# Patient Record
Sex: FEMALE | Race: BLACK OR AFRICAN AMERICAN | Employment: UNEMPLOYED | URBAN - METROPOLITAN AREA
[De-identification: names, ages, dates, MRNs, and addresses within clinical notes are randomized per-mention and may not be internally consistent; named-entity substitution may affect disease eponyms.]

---

## 2023-12-27 ENCOUNTER — HOSPITAL ENCOUNTER (OUTPATIENT)
Age: 28
Discharge: HOME | End: 2023-12-27
Attending: OBSTETRICS & GYNECOLOGY | Admitting: OBSTETRICS & GYNECOLOGY

## 2023-12-27 ENCOUNTER — APPOINTMENT (OUTPATIENT)
Dept: ULTRASOUND IMAGING | Age: 28
End: 2023-12-27

## 2023-12-27 VITALS
SYSTOLIC BLOOD PRESSURE: 104 MMHG | OXYGEN SATURATION: 99 % | TEMPERATURE: 98.4 F | DIASTOLIC BLOOD PRESSURE: 67 MMHG | RESPIRATION RATE: 18 BRPM | HEART RATE: 80 BPM

## 2023-12-27 LAB
ABO/RH: NORMAL
AMPHETAMINES: NEGATIVE
ANTIBODY SCREEN: NEGATIVE
B PARAP IS1001 DNA NPH QL NAA+NON-PROBE: NOT DETECTED
B PERT.PT PRMT NPH QL NAA+NON-PROBE: NOT DETECTED
BACTERIA: ABNORMAL /HPF
BARBITURATE SCREEN URINE: NEGATIVE
BASOPHILS ABSOLUTE: 0 K/CU MM
BASOPHILS RELATIVE PERCENT: 0.5 % (ref 0–1)
BENZODIAZEPINE SCREEN, URINE: NEGATIVE
BILIRUBIN URINE: NEGATIVE MG/DL
BLOOD, URINE: NEGATIVE
C PNEUM DNA NPH QL NAA+NON-PROBE: NOT DETECTED
CANNABINOID SCREEN URINE: NEGATIVE
CLARITY: CLEAR
COCAINE METABOLITE: NEGATIVE
COLOR: YELLOW
DIFFERENTIAL TYPE: ABNORMAL
EOSINOPHILS ABSOLUTE: 0.1 K/CU MM
EOSINOPHILS RELATIVE PERCENT: 0.9 % (ref 0–3)
FENTANYL URINE: NEGATIVE
FLUAV H1 2009 PAN RNA NPH NAA+NON-PROBE: NOT DETECTED
FLUAV H1 RNA NPH QL NAA+NON-PROBE: NOT DETECTED
FLUAV H3 RNA NPH QL NAA+NON-PROBE: NOT DETECTED
FLUAV RNA NPH QL NAA+NON-PROBE: NOT DETECTED
FLUBV RNA NPH QL NAA+NON-PROBE: NOT DETECTED
GLUCOSE, URINE: NEGATIVE MG/DL
HADV DNA NPH QL NAA+NON-PROBE: NOT DETECTED
HBV SURFACE AG SERPL QL IA: NON REACTIVE
HCOV 229E RNA NPH QL NAA+NON-PROBE: NOT DETECTED
HCOV HKU1 RNA NPH QL NAA+NON-PROBE: NOT DETECTED
HCOV NL63 RNA NPH QL NAA+NON-PROBE: NOT DETECTED
HCOV OC43 RNA NPH QL NAA+NON-PROBE: NOT DETECTED
HCT VFR BLD CALC: 33.2 % (ref 37–47)
HCV AB SERPL QL IA: NON REACTIVE
HEMOGLOBIN: 10.5 GM/DL (ref 12.5–16)
HIV 1+2 AB+HIV1P24 AG SERPLBLD IA.RAPID: NON REACTIVE
HMPV RNA NPH QL NAA+NON-PROBE: NOT DETECTED
HPIV1 RNA NPH QL NAA+NON-PROBE: NOT DETECTED
HPIV2 RNA NPH QL NAA+NON-PROBE: NOT DETECTED
HPIV3 RNA NPH QL NAA+NON-PROBE: NOT DETECTED
HPIV4 RNA NPH QL NAA+NON-PROBE: NOT DETECTED
IMMATURE NEUTROPHIL %: 0.3 % (ref 0–0.43)
KETONES, URINE: NEGATIVE MG/DL
LEUKOCYTE ESTERASE, URINE: ABNORMAL
LYMPHOCYTES ABSOLUTE: 2 K/CU MM
LYMPHOCYTES RELATIVE PERCENT: 29.6 % (ref 24–44)
M PNEUMO DNA NPH QL NAA+NON-PROBE: NOT DETECTED
MCH RBC QN AUTO: 28 PG (ref 27–31)
MCHC RBC AUTO-ENTMCNC: 31.6 % (ref 32–36)
MCV RBC AUTO: 88.5 FL (ref 78–100)
MONOCYTES ABSOLUTE: 0.5 K/CU MM
MONOCYTES RELATIVE PERCENT: 7.2 % (ref 0–4)
MUCUS: ABNORMAL HPF
NITRITE URINE, QUANTITATIVE: NEGATIVE
NUCLEATED RBC %: 0 %
OPIATES, URINE: NEGATIVE
OXYCODONE: NEGATIVE
PDW BLD-RTO: 13.4 % (ref 11.7–14.9)
PH, URINE: 7 (ref 5–8)
PLATELET # BLD: 174 K/CU MM (ref 140–440)
PMV BLD AUTO: 13 FL (ref 7.5–11.1)
PROTEIN UA: NEGATIVE MG/DL
RBC # BLD: 3.75 M/CU MM (ref 4.2–5.4)
RBC URINE: <1 /HPF (ref 0–6)
RPR SER QL: NON REACTIVE
RSV RNA NPH QL NAA+NON-PROBE: NOT DETECTED
RV+EV RNA NPH QL NAA+NON-PROBE: NOT DETECTED
SARS-COV-2 RNA NPH QL NAA+NON-PROBE: NOT DETECTED
SEGMENTED NEUTROPHILS ABSOLUTE COUNT: 4.1 K/CU MM
SEGMENTED NEUTROPHILS RELATIVE PERCENT: 61.5 % (ref 36–66)
SPECIFIC GRAVITY UA: 1.01 (ref 1–1.03)
SQUAMOUS EPITHELIAL: 8 /HPF
T. PALLIDUM SCREEN: NEGATIVE
TOTAL IMMATURE NEUTOROPHIL: 0.02 K/CU MM
TOTAL NUCLEATED RBC: 0 K/CU MM
TRICHOMONAS: ABNORMAL /HPF
UROBILINOGEN, URINE: 0.2 MG/DL (ref 0.2–1)
WBC # BLD: 6.7 K/CU MM (ref 4–10.5)
WBC UA: 1 /HPF (ref 0–5)

## 2023-12-27 PROCEDURE — 86780 TREPONEMA PALLIDUM: CPT

## 2023-12-27 PROCEDURE — 85025 COMPLETE CBC W/AUTO DIFF WBC: CPT

## 2023-12-27 PROCEDURE — 86850 RBC ANTIBODY SCREEN: CPT

## 2023-12-27 PROCEDURE — 86900 BLOOD TYPING SEROLOGIC ABO: CPT

## 2023-12-27 PROCEDURE — 87491 CHLMYD TRACH DNA AMP PROBE: CPT

## 2023-12-27 PROCEDURE — 76805 OB US >/= 14 WKS SNGL FETUS: CPT

## 2023-12-27 PROCEDURE — 81001 URINALYSIS AUTO W/SCOPE: CPT

## 2023-12-27 PROCEDURE — 80307 DRUG TEST PRSMV CHEM ANLYZR: CPT

## 2023-12-27 PROCEDURE — 86762 RUBELLA ANTIBODY: CPT

## 2023-12-27 PROCEDURE — 0202U NFCT DS 22 TRGT SARS-COV-2: CPT

## 2023-12-27 PROCEDURE — 87389 HIV-1 AG W/HIV-1&-2 AB AG IA: CPT

## 2023-12-27 PROCEDURE — 87081 CULTURE SCREEN ONLY: CPT

## 2023-12-27 PROCEDURE — 87591 N.GONORRHOEAE DNA AMP PROB: CPT

## 2023-12-27 PROCEDURE — 87340 HEPATITIS B SURFACE AG IA: CPT

## 2023-12-27 PROCEDURE — 86803 HEPATITIS C AB TEST: CPT

## 2023-12-27 PROCEDURE — 76811 OB US DETAILED SNGL FETUS: CPT

## 2023-12-27 PROCEDURE — 86901 BLOOD TYPING SEROLOGIC RH(D): CPT

## 2023-12-27 PROCEDURE — 86592 SYPHILIS TEST NON-TREP QUAL: CPT

## 2023-12-27 PROCEDURE — 99213 OFFICE O/P EST LOW 20 MIN: CPT

## 2023-12-27 RX ORDER — ACETAMINOPHEN 500 MG
1000 TABLET ORAL EVERY 8 HOURS SCHEDULED
Status: DISCONTINUED | OUTPATIENT
Start: 2023-12-27 | End: 2023-12-27 | Stop reason: HOSPADM

## 2023-12-27 NOTE — FLOWSHEET NOTE
Pt presents amb to unit for c/o not feeling well and neck soreness. States has been ongoing for past month. Last Dr visit was about a month ago while in [facility]. Denies vaginal bleeding or leaking fluid. Abd soft and non tender. Denies vomiting or diarrhea. POC discussed.  [place] notified of pt arrival. Yes

## 2023-12-27 NOTE — FLOWSHEET NOTE
EFM of discharge instructions given and explained. Instructed to follow up on unit for decreased FM, vaginal bleeding, leaking fluid, or contractions. Pt verbalized understanding and left amb from unit for home without distress.

## 2023-12-27 NOTE — FLOWSHEET NOTE
Use of [place]  ID [id number] to explain the need to draw labs, and obtain COVID, flu and GBS swabs. Pt voiced understanding and consents. Denies questions or concerns.

## 2023-12-27 NOTE — FLOWSHEET NOTE
Pt tolerated getting labs and swabs obtained. Denies any questions or concerns. Call light within reach and bed in lowest position.

## 2023-12-28 LAB — T PALLIDUM AB SER QL AGGL: NON REACTIVE

## 2023-12-29 LAB
C TRACH RRNA SPEC QL NAA+PROBE: NEGATIVE
N GONORRHOEA RRNA SPEC QL NAA+PROBE: NEGATIVE
RUBV IGG SER-ACNC: 40.6 IU/ML

## 2023-12-31 LAB
CULTURE: NORMAL
Lab: NORMAL
SPECIMEN: NORMAL

## 2024-01-20 ENCOUNTER — HOSPITAL ENCOUNTER (INPATIENT)
Age: 29
LOS: 2 days | Discharge: HOME | DRG: 560 | End: 2024-01-22
Attending: OBSTETRICS & GYNECOLOGY | Admitting: OBSTETRICS & GYNECOLOGY
Payer: COMMERCIAL

## 2024-01-20 ENCOUNTER — ANESTHESIA EVENT (OUTPATIENT)
Dept: LABOR AND DELIVERY | Age: 29
End: 2024-01-20
Payer: COMMERCIAL

## 2024-01-20 ENCOUNTER — ANESTHESIA (OUTPATIENT)
Dept: LABOR AND DELIVERY | Age: 29
End: 2024-01-20
Payer: COMMERCIAL

## 2024-01-20 LAB
ABO/RH: NORMAL
AMPHETAMINES: NEGATIVE
ANTIBODY SCREEN: NEGATIVE
BARBITURATE SCREEN URINE: NEGATIVE
BASOPHILS ABSOLUTE: 0 K/CU MM
BASOPHILS RELATIVE PERCENT: 0.5 % (ref 0–1)
BENZODIAZEPINE SCREEN, URINE: NEGATIVE
CANNABINOID SCREEN URINE: NEGATIVE
COCAINE METABOLITE: NEGATIVE
DIFFERENTIAL TYPE: ABNORMAL
EOSINOPHILS ABSOLUTE: 0.1 K/CU MM
EOSINOPHILS RELATIVE PERCENT: 2.1 % (ref 0–3)
FENTANYL URINE: NEGATIVE
GLUCOSE BLD-MCNC: 82 MG/DL (ref 70–99)
HCT VFR BLD CALC: 35.2 % (ref 37–47)
HEMOGLOBIN: 11.3 GM/DL (ref 12.5–16)
IMMATURE NEUTROPHIL %: 0.4 % (ref 0–0.43)
LYMPHOCYTES ABSOLUTE: 2.3 K/CU MM
LYMPHOCYTES RELATIVE PERCENT: 41.6 % (ref 24–44)
MCH RBC QN AUTO: 27.2 PG (ref 27–31)
MCHC RBC AUTO-ENTMCNC: 32.1 % (ref 32–36)
MCV RBC AUTO: 84.6 FL (ref 78–100)
MONOCYTES ABSOLUTE: 0.4 K/CU MM
MONOCYTES RELATIVE PERCENT: 7.3 % (ref 0–4)
NUCLEATED RBC %: 0 %
OPIATES, URINE: NEGATIVE
OXYCODONE: NEGATIVE
PDW BLD-RTO: 15.6 % (ref 11.7–14.9)
PLATELET # BLD: 198 K/CU MM (ref 140–440)
PMV BLD AUTO: 12.4 FL (ref 7.5–11.1)
RBC # BLD: 4.16 M/CU MM (ref 4.2–5.4)
SEGMENTED NEUTROPHILS ABSOLUTE COUNT: 2.7 K/CU MM
SEGMENTED NEUTROPHILS RELATIVE PERCENT: 48.1 % (ref 36–66)
T. PALLIDUM SCREEN: NEGATIVE
TOTAL IMMATURE NEUTOROPHIL: 0.02 K/CU MM
TOTAL NUCLEATED RBC: 0 K/CU MM
WBC # BLD: 5.6 K/CU MM (ref 4–10.5)

## 2024-01-20 PROCEDURE — 86900 BLOOD TYPING SEROLOGIC ABO: CPT

## 2024-01-20 PROCEDURE — 86850 RBC ANTIBODY SCREEN: CPT

## 2024-01-20 PROCEDURE — 6360000002 HC RX W HCPCS: Performed by: OBSTETRICS & GYNECOLOGY

## 2024-01-20 PROCEDURE — 2580000003 HC RX 258: Performed by: OBSTETRICS & GYNECOLOGY

## 2024-01-20 PROCEDURE — 6370000000 HC RX 637 (ALT 250 FOR IP): Performed by: OBSTETRICS & GYNECOLOGY

## 2024-01-20 PROCEDURE — 10907ZC DRAINAGE OF AMNIOTIC FLUID, THERAPEUTIC FROM PRODUCTS OF CONCEPTION, VIA NATURAL OR ARTIFICIAL OPENING: ICD-10-PCS | Performed by: OBSTETRICS & GYNECOLOGY

## 2024-01-20 PROCEDURE — 2500000003 HC RX 250 WO HCPCS: Performed by: NURSE ANESTHETIST, CERTIFIED REGISTERED

## 2024-01-20 PROCEDURE — 6360000002 HC RX W HCPCS: Performed by: NURSE ANESTHETIST, CERTIFIED REGISTERED

## 2024-01-20 PROCEDURE — 3700000025 EPIDURAL BLOCK: Performed by: ANESTHESIOLOGY

## 2024-01-20 PROCEDURE — 1220000000 HC SEMI PRIVATE OB R&B

## 2024-01-20 PROCEDURE — 80307 DRUG TEST PRSMV CHEM ANLYZR: CPT

## 2024-01-20 PROCEDURE — 82962 GLUCOSE BLOOD TEST: CPT

## 2024-01-20 PROCEDURE — 86780 TREPONEMA PALLIDUM: CPT

## 2024-01-20 PROCEDURE — 7200000001 HC VAGINAL DELIVERY

## 2024-01-20 PROCEDURE — 51702 INSERT TEMP BLADDER CATH: CPT

## 2024-01-20 PROCEDURE — 86901 BLOOD TYPING SEROLOGIC RH(D): CPT

## 2024-01-20 PROCEDURE — 85025 COMPLETE CBC W/AUTO DIFF WBC: CPT

## 2024-01-20 PROCEDURE — 2500000003 HC RX 250 WO HCPCS: Performed by: ADVANCED PRACTICE MIDWIFE

## 2024-01-20 PROCEDURE — 0KQM0ZZ REPAIR PERINEUM MUSCLE, OPEN APPROACH: ICD-10-PCS | Performed by: OBSTETRICS & GYNECOLOGY

## 2024-01-20 RX ORDER — METHYLERGONOVINE MALEATE 0.2 MG/ML
200 INJECTION INTRAVENOUS PRN
Status: DISCONTINUED | OUTPATIENT
Start: 2024-01-20 | End: 2024-01-20 | Stop reason: SDUPTHER

## 2024-01-20 RX ORDER — SODIUM CHLORIDE 9 MG/ML
INJECTION, SOLUTION INTRAVENOUS PRN
Status: DISCONTINUED | OUTPATIENT
Start: 2024-01-20 | End: 2024-01-22 | Stop reason: HOSPADM

## 2024-01-20 RX ORDER — FAMOTIDINE 10 MG/ML
20 INJECTION, SOLUTION INTRAVENOUS 2 TIMES DAILY
Status: DISCONTINUED | OUTPATIENT
Start: 2024-01-20 | End: 2024-01-20

## 2024-01-20 RX ORDER — ROPIVACAINE HYDROCHLORIDE 2 MG/ML
INJECTION, SOLUTION EPIDURAL; INFILTRATION; PERINEURAL PRN
Status: DISCONTINUED | OUTPATIENT
Start: 2024-01-20 | End: 2024-01-20 | Stop reason: SDUPTHER

## 2024-01-20 RX ORDER — ROPIVACAINE HYDROCHLORIDE 2 MG/ML
10 INJECTION, SOLUTION EPIDURAL; INFILTRATION; PERINEURAL CONTINUOUS
Status: DISCONTINUED | OUTPATIENT
Start: 2024-01-20 | End: 2024-01-20

## 2024-01-20 RX ORDER — LIDOCAINE HCL/EPINEPHRINE/PF 2%-1:200K
VIAL (ML) INJECTION PRN
Status: DISCONTINUED | OUTPATIENT
Start: 2024-01-20 | End: 2024-01-20 | Stop reason: SDUPTHER

## 2024-01-20 RX ORDER — ACETAMINOPHEN 325 MG/1
650 TABLET ORAL EVERY 4 HOURS PRN
Status: DISCONTINUED | OUTPATIENT
Start: 2024-01-20 | End: 2024-01-20

## 2024-01-20 RX ORDER — NALOXONE HYDROCHLORIDE 0.4 MG/ML
INJECTION, SOLUTION INTRAMUSCULAR; INTRAVENOUS; SUBCUTANEOUS PRN
Status: DISCONTINUED | OUTPATIENT
Start: 2024-01-20 | End: 2024-01-20

## 2024-01-20 RX ORDER — SODIUM CHLORIDE 0.9 % (FLUSH) 0.9 %
5-40 SYRINGE (ML) INJECTION EVERY 12 HOURS SCHEDULED
Status: DISCONTINUED | OUTPATIENT
Start: 2024-01-20 | End: 2024-01-22 | Stop reason: HOSPADM

## 2024-01-20 RX ORDER — TRANEXAMIC ACID 10 MG/ML
1000 INJECTION, SOLUTION INTRAVENOUS
Status: DISCONTINUED | OUTPATIENT
Start: 2024-01-20 | End: 2024-01-20 | Stop reason: SDUPTHER

## 2024-01-20 RX ORDER — METHYLERGONOVINE MALEATE 0.2 MG/ML
200 INJECTION INTRAVENOUS PRN
Status: DISCONTINUED | OUTPATIENT
Start: 2024-01-20 | End: 2024-01-22 | Stop reason: HOSPADM

## 2024-01-20 RX ORDER — ACETAMINOPHEN 500 MG
1000 TABLET ORAL EVERY 8 HOURS SCHEDULED
Status: DISCONTINUED | OUTPATIENT
Start: 2024-01-20 | End: 2024-01-22 | Stop reason: HOSPADM

## 2024-01-20 RX ORDER — ONDANSETRON 2 MG/ML
4 INJECTION INTRAMUSCULAR; INTRAVENOUS EVERY 6 HOURS PRN
Status: DISCONTINUED | OUTPATIENT
Start: 2024-01-20 | End: 2024-01-22 | Stop reason: HOSPADM

## 2024-01-20 RX ORDER — MISOPROSTOL 200 UG/1
200 TABLET ORAL PRN
Status: DISCONTINUED | OUTPATIENT
Start: 2024-01-20 | End: 2024-01-22 | Stop reason: HOSPADM

## 2024-01-20 RX ORDER — LIDOCAINE HYDROCHLORIDE 10 MG/ML
INJECTION, SOLUTION EPIDURAL; INFILTRATION; INTRACAUDAL; PERINEURAL PRN
Status: DISCONTINUED | OUTPATIENT
Start: 2024-01-20 | End: 2024-01-20 | Stop reason: SDUPTHER

## 2024-01-20 RX ORDER — SODIUM CHLORIDE 0.9 % (FLUSH) 0.9 %
5-40 SYRINGE (ML) INJECTION PRN
Status: DISCONTINUED | OUTPATIENT
Start: 2024-01-20 | End: 2024-01-22 | Stop reason: HOSPADM

## 2024-01-20 RX ORDER — MISOPROSTOL 200 UG/1
800 TABLET ORAL PRN
Status: DISCONTINUED | OUTPATIENT
Start: 2024-01-20 | End: 2024-01-20 | Stop reason: SDUPTHER

## 2024-01-20 RX ORDER — DOCUSATE SODIUM 100 MG/1
100 CAPSULE, LIQUID FILLED ORAL 2 TIMES DAILY
Status: DISCONTINUED | OUTPATIENT
Start: 2024-01-20 | End: 2024-01-22 | Stop reason: HOSPADM

## 2024-01-20 RX ORDER — MISOPROSTOL 200 UG/1
800 TABLET ORAL PRN
Status: DISCONTINUED | OUTPATIENT
Start: 2024-01-20 | End: 2024-01-22 | Stop reason: HOSPADM

## 2024-01-20 RX ORDER — ONDANSETRON 2 MG/ML
4 INJECTION INTRAMUSCULAR; INTRAVENOUS EVERY 6 HOURS PRN
Status: DISCONTINUED | OUTPATIENT
Start: 2024-01-20 | End: 2024-01-20 | Stop reason: SDUPTHER

## 2024-01-20 RX ORDER — CARBOPROST TROMETHAMINE 250 UG/ML
250 INJECTION, SOLUTION INTRAMUSCULAR PRN
Status: DISCONTINUED | OUTPATIENT
Start: 2024-01-20 | End: 2024-01-20

## 2024-01-20 RX ORDER — IBUPROFEN 800 MG/1
800 TABLET ORAL EVERY 8 HOURS SCHEDULED
Status: DISCONTINUED | OUTPATIENT
Start: 2024-01-20 | End: 2024-01-22 | Stop reason: HOSPADM

## 2024-01-20 RX ORDER — TRANEXAMIC ACID 10 MG/ML
1000 INJECTION, SOLUTION INTRAVENOUS
Status: ACTIVE | OUTPATIENT
Start: 2024-01-20 | End: 2024-01-21

## 2024-01-20 RX ORDER — FENTANYL CITRATE 50 UG/ML
100 INJECTION, SOLUTION INTRAMUSCULAR; INTRAVENOUS
Status: DISCONTINUED | OUTPATIENT
Start: 2024-01-20 | End: 2024-01-20

## 2024-01-20 RX ORDER — SODIUM CHLORIDE, SODIUM LACTATE, POTASSIUM CHLORIDE, CALCIUM CHLORIDE 600; 310; 30; 20 MG/100ML; MG/100ML; MG/100ML; MG/100ML
INJECTION, SOLUTION INTRAVENOUS CONTINUOUS
Status: DISCONTINUED | OUTPATIENT
Start: 2024-01-20 | End: 2024-01-20

## 2024-01-20 RX ORDER — LANOLIN 72 %
OINTMENT (GRAM) TOPICAL PRN
Status: DISCONTINUED | OUTPATIENT
Start: 2024-01-20 | End: 2024-01-22 | Stop reason: HOSPADM

## 2024-01-20 RX ORDER — LIDOCAINE HYDROCHLORIDE 10 MG/ML
30 INJECTION, SOLUTION EPIDURAL; INFILTRATION; INTRACAUDAL; PERINEURAL PRN
Status: DISCONTINUED | OUTPATIENT
Start: 2024-01-20 | End: 2024-01-20

## 2024-01-20 RX ADMIN — DOCUSATE SODIUM 100 MG: 100 CAPSULE, LIQUID FILLED ORAL at 20:43

## 2024-01-20 RX ADMIN — SODIUM CHLORIDE, POTASSIUM CHLORIDE, SODIUM LACTATE AND CALCIUM CHLORIDE: 600; 310; 30; 20 INJECTION, SOLUTION INTRAVENOUS at 04:07

## 2024-01-20 RX ADMIN — IBUPROFEN 800 MG: 800 TABLET, FILM COATED ORAL at 17:45

## 2024-01-20 RX ADMIN — FENTANYL CITRATE 100 MCG: 50 INJECTION, SOLUTION INTRAMUSCULAR; INTRAVENOUS at 05:33

## 2024-01-20 RX ADMIN — Medication 166.7 ML: at 15:47

## 2024-01-20 RX ADMIN — LIDOCAINE HYDROCHLORIDE 3 ML: 10 INJECTION, SOLUTION EPIDURAL; INFILTRATION; INTRACAUDAL; PERINEURAL at 08:00

## 2024-01-20 RX ADMIN — ROPIVACAINE HYDROCHLORIDE 10 ML: 2 INJECTION, SOLUTION EPIDURAL; INFILTRATION at 08:07

## 2024-01-20 RX ADMIN — LIDOCAINE HYDROCHLORIDE AND EPINEPHRINE 3 ML: 20; 5 INJECTION, SOLUTION EPIDURAL; INFILTRATION; INTRACAUDAL; PERINEURAL at 08:06

## 2024-01-20 RX ADMIN — FAMOTIDINE 20 MG: 10 INJECTION, SOLUTION INTRAVENOUS at 13:39

## 2024-01-20 RX ADMIN — Medication 1 MILLI-UNITS/MIN: at 08:54

## 2024-01-20 RX ADMIN — IBUPROFEN 800 MG: 800 TABLET, FILM COATED ORAL at 20:43

## 2024-01-20 RX ADMIN — ACETAMINOPHEN 1000 MG: 500 TABLET ORAL at 20:43

## 2024-01-20 RX ADMIN — Medication 10 ML: at 21:00

## 2024-01-20 RX ADMIN — ROPIVACAINE HYDROCHLORIDE 10 ML/HR: 2 INJECTION, SOLUTION EPIDURAL; INFILTRATION at 08:08

## 2024-01-20 RX ADMIN — SODIUM CHLORIDE, POTASSIUM CHLORIDE, SODIUM LACTATE AND CALCIUM CHLORIDE: 600; 310; 30; 20 INJECTION, SOLUTION INTRAVENOUS at 07:55

## 2024-01-20 RX ADMIN — SODIUM CHLORIDE, POTASSIUM CHLORIDE, SODIUM LACTATE AND CALCIUM CHLORIDE: 600; 310; 30; 20 INJECTION, SOLUTION INTRAVENOUS at 09:38

## 2024-01-20 ASSESSMENT — PAIN DESCRIPTION - LOCATION: LOCATION: ABDOMEN

## 2024-01-20 ASSESSMENT — PAIN - FUNCTIONAL ASSESSMENT
PAIN_FUNCTIONAL_ASSESSMENT: ACTIVITIES ARE NOT PREVENTED
PAIN_FUNCTIONAL_ASSESSMENT: ACTIVITIES ARE NOT PREVENTED

## 2024-01-20 ASSESSMENT — PAIN SCALES - GENERAL
PAINLEVEL_OUTOF10: 0
PAINLEVEL_OUTOF10: 9
PAINLEVEL_OUTOF10: 0

## 2024-01-20 ASSESSMENT — PAIN DESCRIPTION - DESCRIPTORS: DESCRIPTORS: CRAMPING

## 2024-01-20 ASSESSMENT — PAIN DESCRIPTION - ORIENTATION: ORIENTATION: LOWER

## 2024-01-20 NOTE — ANESTHESIA PRE PROCEDURE
Department of Anesthesiology  Preprocedure Note       Name:  Lorrie Tierney   Age:  28 y.o.  :  1995                                          MRN:  [id number]         Date:  2024      Surgeon: * No surgeons listed *    Procedure: * No procedures listed *    Medications prior to admission:   Prior to Admission medications    Medication Sig Start Date End Date Taking? Authorizing Provider   Prenatal MV-Min-Fe Fum-FA-DHA (PRENATAL 1 PO) Take by mouth    Provider, MD Chika       Current medications:    Current Facility-Administered Medications   Medication Dose Route Frequency Provider Last Rate Last Admin   • lactated ringers IV soln infusion   IntraVENous Continuous Giles Santos  mL/hr at 24 0930 Rate Verify at 24 0930   • ondansetron (ZOFRAN) injection 4 mg  4 mg IntraVENous Q6H PRN Giles Santos MD       • oxytocin (PITOCIN) 30 units in 500 mL infusion  87.3 inez-units/min IntraVENous Continuous PRN Giles Santos MD        And   • oxytocin (PITOCIN) 10 unit bolus from the bag  10 Units IntraVENous PRN Giles Santos MD       • methylergonovine (METHERGINE) injection 200 mcg  200 mcg IntraMUSCular PRN Giles Santos MD       • carboprost (HEMABATE) injection 250 mcg  250 mcg IntraMUSCular PRN Giles Santos MD       • miSOPROStol (CYTOTEC) tablet 800 mcg  800 mcg Rectal PRN Giles Santos MD       • tranexamic acid-NaCl IVPB premix 1,000 mg  1,000 mg IntraVENous Once PRN Giles Santos MD       • acetaminophen (TYLENOL) tablet 650 mg  650 mg Oral Q4H PRN Giles Santos MD       • benzocaine-menthol (DERMOPLAST) 20-0.5 % spray   Topical PRN Giles Santos MD       • fentaNYL (SUBLIMAZE) injection 100 mcg  100 mcg IntraVENous Q1H PRN Giles Santos MD   100 mcg at 24 0533   • lidocaine PF 1 % injection 30 mL  30 mL Other PRN Giles Santos MD       • oxytocin (PITOCIN)

## 2024-01-20 NOTE — L&D DELIVERY SUMMARY NOTE
Department of Obstetrics and Gynecology      Labor & Delivery Summary  Dilation Complete Date: 24  Dilation Complete Time: 1528    Pre-operative Diagnosis:  Term pregnancy, Spontaneous labor, Single fetus, and Pregnancy complicated by: essentially no prenatal care    Post-operative Diagnosis:  Same + live female   Procedure: outlet Vacuum assisted delivery.   With 2nd degree midline episiotomy repaired in standard fashion for fetal bradycardia with small introitus  Surgeon:  Giles Santos MD    Information for the patient's :  Enedina Tierney [[id number]]        Anesthesia:  epidural anesthesia    Estimated blood loss:  300    Specimen:  Placenta not sent to pathology     Cord blood sent Yes    Complications:  none    Condition:  infant stable to general nursery    Details of Procedure:   The patient is a 28 y.o. female at 40w6d   OB History          1    Para        Term                AB        Living             SAB        IAB        Ectopic        Molar        Multiple        Live Births                 who was admitted for active phase labor. She received the following interventions: ARBOW and IV Pitocin augmentation She was known to be GBS negative and did not receive antibiotic prophylaxis. The patient progressed well,did receive an epidural, became complete and started to push. Due to non reassuring fetal heart tones with pushing consent was obtained for the use of vacuum to assist in delivery and was placed without dificulty.  Fetal head was in the OA position.  There was 1 pop off.   The perineum was excessively thick and the introitus small so a 2 cm MLE was made to facilitate rapid delivery of the head. The fetal head was then delivered over the perineum, nose and mouth suctioned with bulb suction and the rest of the infant delivered atraumatically, placed on mother abdomen. Cord was clamped and cut and infant handed off to the waiting nurse for evaluation.

## 2024-01-20 NOTE — DISCHARGE INSTRUCTIONS
Bebeto mazariegos [facility] Hernandez  2-1-1: Julienfòjon harrison [phone] o 2-1-1 [email or web address]  [employer]: koupon consuelo erlin, candida gibbs, gadri sibvansyone, PRC  [phone]  [facility]: erlin, èd ak lwchagoe, rad ak b  [phone] or [phone]  Tab ellis: erlin èd ak lwaye, rad ak b  [phone]  [facility] ak [address]:  [facility]  [address] [place] [address]  [phone]  sumanth kenya high yo  [address] [place] [address]  [phone]  [facility]: [facility], klinik sante granmoandrés vera dapre revni ou  [address] [place], [place]  [phone] o [phone]  Jamiejose zambrano [facility]: asistans consuelo [facility]: [phone] [email or web address]: asistans consuelo [facility]: [facility] marzena, [facility]o vera selon revni ou  [address]  [place] [address]  [phone]  [facility] [place]: Nitrisyon consuelo milenae ak lovely osorio selon revni  [address] [place], [place] [id number] [phone]  [facility]: ofri sèvis otobis [facility]. ADA ofri sèvis pòt an pòt consuelo moun ki gen andikap  [phone]  Make yon vwayaj:  [phone]  [facility]: transpò consuelo granmoun bayron yo  [phone]           Consueol pran scott palacios ak [facility] consuelo wè yon doktè, rele [phone] epi mande yon entèprèt consuelo pran yon suzanne [facility] lè l sèvi avèk kòd [phone].     Consuelo aplike consuelo [facility]. Rele [facility] [phone] epi yo pral jwenn yon entèseptè consuelo jamie w nan konWiregrass Medical Center.     Konsènan Carlie consuelo Paran ak Tibebe nan [facility]  Ou pa bezwen yon pasyan [facility] consuelo resevwa èd nan Parent Infant [place]. Nou cleve lazo si ou bezwen kouchèt, fòmil, rad fanmi ki byen itilize, ak rahul leary. Nou ofliz dasilva.

## 2024-01-20 NOTE — FLOWSHEET NOTE
To semi fowlers with right tilt after epidural placement.   Patient tolerated placement well.  assisted during procedure  Time out: 0800  Catheter placed: 0804   Test dose: 0806  Epidural dosed at 0807  Continuous infusion set up at 0812

## 2024-01-20 NOTE — FLOWSHEET NOTE
Tolerated regular meal tray. Ambulated to bathroom with steady gait with nurse at side, voided 900 ml easily, demonstrated axel care. In to wheelchair and taken to MB 10 with baby in basinet. Belongings transferred with patient.

## 2024-01-20 NOTE — FLOWSHEET NOTE
CNM to bedside for SVE, to place IUPC. Bright red vaginal bleeding noted prior to  SVE, IUPC not placed. Cervix 6-7 cm. Care explained to patient with assist of .

## 2024-01-20 NOTE — FLOWSHEET NOTE
Dr. Santos to bedside at 1532,  patient complete, starting to push with contractions. [place] Creole language line assisting. Deceleration to 60 with pushing at 1536 with recovery to 120 by 1538, starting to crown. 1541 Kiwi applied, pulled with contraction, pop off at 1543, then reapplied, pulled with next contraction for delivery at 1544. Vigorous baby girl placed on mother's abdomen, nursery at bedside.

## 2024-01-20 NOTE — H&P
Department of Obstetrics and Gynecology   Obstetrics History and Physical        CHIEF COMPLAINT: No chief complaint on file.    Painful uterine contractions that began yesterday evening.     HISTORY OF PRESENT ILLNESS:      The patient is a 28 y.o. female at 40w6d.  OB History          1    Para        Term                AB        Living             SAB        IAB        Ectopic        Molar        Multiple        Live Births                Patient presents with a chief complaint as above and is being admitted for presumptive active phase labor. SHe is 40w6d with no prenatal care.    Estimated Due Date: Estimated Date of Delivery: 24    PRENATAL CARE:    Complicated by: essentially no PNC with an [place] for dates performed on 23 c/w DAMASO 24. Per her accompanying , [place] 1 [place] and has had no other prenatal care.    PAST OB HISTORY  OB History          1    Para        Term                AB        Living             SAB        IAB        Ectopic        Molar        Multiple        Live Births                    Past Medical History:    No past medical history on file.  Past Surgical History:    No past surgical history on file.  Allergies:  Patient has no known allergies.  Social History:    Social History     Socioeconomic History    Marital status: Single     Spouse name: Not on file    Number of children: Not on file    Years of education: Not on file    Highest education level: Not on file   Occupational History    Not on file   Tobacco Use    Smoking status: Never    Smokeless tobacco: Never   Substance and Sexual Activity    Alcohol use: Not on file    Drug use: Not on file    Sexual activity: Not on file   Other Topics Concern    Not on file   Social History Narrative    Not on file     Social Determinants of Health     Financial Resource Strain: Not on file   Food Insecurity: Food Insecurity Present (2024)    Hunger Vital Sign

## 2024-01-20 NOTE — FLOWSHEET NOTE
Small amount of blood noted on axel pad, fundal tone soft between contractions, patient denies pain at present

## 2024-01-20 NOTE — ANESTHESIA PROCEDURE NOTES
Epidural Block    Patient location during procedure: OB  Start time: 1/20/2024 7:53 AM  End time: 1/20/2024 8:12 AM  Reason for block: labor epidural  Staffing  Performed: resident/CRNA   Anesthesiologist: Checo Crane MD  Resident/CRNA: Coy Omalley APRN - CRNA  Performed by: Coy Omalley APRN - CRNA  Authorized by: Coy Omalley APRN - CRNA    Epidural  Patient position: sitting  Prep: ChloraPrep and site prepped and draped  Patient monitoring: continuous pulse ox and frequent blood pressure checks  Approach: midline  Location: L2-3  Injection technique: CHON saline  Provider prep: mask and sterile gloves  Needle  Needle type: Tuohy   Needle gauge: 18 G  Needle length: 3.5 in  Needle insertion depth: 6 cm  Catheter type: side hole  Catheter size: 19 G  Catheter at skin depth: 12 cm  Test dose: negativeCatheter Secured: tegaderm and tape  Assessment  Sensory level: T10  Hemodynamics: stable  Attempts: 1  Outcomes: uncomplicated and patient tolerated procedure well  Preanesthetic Checklist  Completed: patient identified, IV checked, site marked, risks and benefits discussed, surgical/procedural consents, equipment checked, pre-op evaluation, timeout performed, anesthesia consent given, oxygen available, monitors applied/VS acknowledged, fire risk safety assessment completed and verbalized and blood product R/B/A discussed and consented

## 2024-01-21 LAB — GLUCOSE BLD-MCNC: 94 MG/DL (ref 70–99)

## 2024-01-21 PROCEDURE — 82962 GLUCOSE BLOOD TEST: CPT

## 2024-01-21 PROCEDURE — 6370000000 HC RX 637 (ALT 250 FOR IP): Performed by: OBSTETRICS & GYNECOLOGY

## 2024-01-21 PROCEDURE — 1220000000 HC SEMI PRIVATE OB R&B

## 2024-01-21 RX ADMIN — IBUPROFEN 800 MG: 800 TABLET, FILM COATED ORAL at 14:50

## 2024-01-21 RX ADMIN — ACETAMINOPHEN 1000 MG: 500 TABLET ORAL at 06:20

## 2024-01-21 RX ADMIN — ACETAMINOPHEN 1000 MG: 500 TABLET ORAL at 20:47

## 2024-01-21 RX ADMIN — DOCUSATE SODIUM 100 MG: 100 CAPSULE, LIQUID FILLED ORAL at 09:32

## 2024-01-21 RX ADMIN — DOCUSATE SODIUM 100 MG: 100 CAPSULE, LIQUID FILLED ORAL at 20:47

## 2024-01-21 RX ADMIN — IBUPROFEN 800 MG: 800 TABLET, FILM COATED ORAL at 05:13

## 2024-01-21 ASSESSMENT — PAIN SCALES - GENERAL
PAINLEVEL_OUTOF10: 2
PAINLEVEL_OUTOF10: 3
PAINLEVEL_OUTOF10: 2
PAINLEVEL_OUTOF10: 3
PAINLEVEL_OUTOF10: 1
PAINLEVEL_OUTOF10: 0
PAINLEVEL_OUTOF10: 3
PAINLEVEL_OUTOF10: 3

## 2024-01-21 ASSESSMENT — PAIN DESCRIPTION - FREQUENCY
FREQUENCY: INTERMITTENT

## 2024-01-21 ASSESSMENT — PAIN DESCRIPTION - PAIN TYPE
TYPE: ACUTE PAIN

## 2024-01-21 ASSESSMENT — PAIN DESCRIPTION - ORIENTATION
ORIENTATION: LOWER
ORIENTATION: LOWER;MID
ORIENTATION: LOWER

## 2024-01-21 ASSESSMENT — PAIN DESCRIPTION - ONSET
ONSET: GRADUAL

## 2024-01-21 ASSESSMENT — PAIN - FUNCTIONAL ASSESSMENT
PAIN_FUNCTIONAL_ASSESSMENT: ACTIVITIES ARE NOT PREVENTED

## 2024-01-21 ASSESSMENT — PAIN DESCRIPTION - DESCRIPTORS
DESCRIPTORS: DISCOMFORT;SORE
DESCRIPTORS: CRAMPING
DESCRIPTORS: DISCOMFORT;SORE

## 2024-01-21 ASSESSMENT — PAIN DESCRIPTION - LOCATION
LOCATION: ABDOMEN
LOCATION: PERINEUM
LOCATION: ABDOMEN
LOCATION: ABDOMEN
LOCATION: PERINEUM

## 2024-01-21 NOTE — CONSULTS
Session ID: [id number]  Language: Cristian Aguayo   ID: #[id number]   Name: Stella  Feeding plan discussed with patient. Pt to supplement after each breastfeed

## 2024-01-21 NOTE — CARE COORDINATION
GAUDENCIOW received  consult requesting assistance with car seat and crib and needing food/housing resource info.  GAUDENCIOW provided list of emergency resources located in [facility] in [place] Creole language on AVS.  [place] at [place].  [place] is eligible for [place] n pack at [place], if available.  If no pack n plays are available, pt will need to contact [facility] Dept of Health on Mon to obtain one.  Electronically signed by LILIANE Payne on 1/21/2024 at 6:42 AM

## 2024-01-21 NOTE — CONSULTS
Session ID: [id number]  Language: Cristian Aguayo   ID: #[id number]   Name: Stella   Plan of care discussed. Pt speaks Scottish creole   Intraductal carcinoma in situ of left breast

## 2024-01-22 VITALS
OXYGEN SATURATION: 100 % | DIASTOLIC BLOOD PRESSURE: 64 MMHG | HEART RATE: 80 BPM | TEMPERATURE: 98.4 F | RESPIRATION RATE: 18 BRPM | SYSTOLIC BLOOD PRESSURE: 118 MMHG

## 2024-01-22 PROCEDURE — 6370000000 HC RX 637 (ALT 250 FOR IP): Performed by: OBSTETRICS & GYNECOLOGY

## 2024-01-22 RX ORDER — IBUPROFEN 800 MG/1
800 TABLET ORAL EVERY 8 HOURS SCHEDULED
Qty: 40 TABLET | Refills: 1 | Status: SHIPPED | OUTPATIENT
Start: 2024-01-22

## 2024-01-22 RX ORDER — PSEUDOEPHEDRINE HCL 30 MG
100 TABLET ORAL 2 TIMES DAILY
Qty: 30 CAPSULE | Refills: 1 | Status: SHIPPED | OUTPATIENT
Start: 2024-01-22

## 2024-01-22 RX ADMIN — IBUPROFEN 800 MG: 800 TABLET, FILM COATED ORAL at 08:49

## 2024-01-22 RX ADMIN — DOCUSATE SODIUM 100 MG: 100 CAPSULE, LIQUID FILLED ORAL at 08:49

## 2024-01-22 RX ADMIN — ACETAMINOPHEN 1000 MG: 500 TABLET ORAL at 04:59

## 2024-01-22 RX ADMIN — IBUPROFEN 800 MG: 800 TABLET, FILM COATED ORAL at 00:22

## 2024-01-22 ASSESSMENT — PAIN DESCRIPTION - DESCRIPTORS: DESCRIPTORS: CRAMPING

## 2024-01-22 ASSESSMENT — PAIN DESCRIPTION - ONSET: ONSET: GRADUAL

## 2024-01-22 ASSESSMENT — PAIN DESCRIPTION - LOCATION: LOCATION: ABDOMEN

## 2024-01-22 ASSESSMENT — PAIN DESCRIPTION - PAIN TYPE: TYPE: ACUTE PAIN

## 2024-01-22 ASSESSMENT — PAIN SCALES - GENERAL
PAINLEVEL_OUTOF10: 0
PAINLEVEL_OUTOF10: 2
PAINLEVEL_OUTOF10: 0
PAINLEVEL_OUTOF10: 0

## 2024-01-22 ASSESSMENT — PAIN DESCRIPTION - ORIENTATION: ORIENTATION: LOWER

## 2024-01-22 ASSESSMENT — PAIN DESCRIPTION - FREQUENCY: FREQUENCY: INTERMITTENT

## 2024-01-22 NOTE — FLOWSHEET NOTE
FINAL DISCHARGE INSTRUCTIONS REVIEWED FOR BOTH MOM AND INFANT. APPROPRIATE QUESTIONS ASKED AND VOICED UNDERSTANDING. ACKNOWLEDGES NEED FOR FOLLOW-UP APPOINTMENTS FOR BOTH SHE AND INFANT. INFO GIVEN ON [facility] DEPT FOR ASSISTANCE IN APPOINTMENTS AND BIRTH CERT INFO. VOICED UNDERSTANDING AND UNDERSTANDING MAPS AND CONTACT NUMBERS. INFANT CAR SEAT PROVIDED AS PER MOM'S STATED NEED. REPORTS THAT SHE HAS A CRIB. ID BRACELET AND HUGS TAG REMOVED, INFO VERIFIED PER MOM AND FORM SIGNED. MOM SECURES INFANT INTO CAR SEAT.

## 2024-01-22 NOTE — PLAN OF CARE
Problem: Pain  Goal: Verbalizes/displays adequate comfort level or baseline comfort level  1/20/2024 2332 by Triny Ramírez RN  Outcome: Progressing  1/20/2024 1813 by Lisbet Madsen RN  Outcome: Progressing     Problem: Infection - Adult  Goal: Absence of infection at discharge  1/20/2024 2332 by Triny Ramírez RN  Outcome: Progressing  1/20/2024 1813 by Lisbet Madsen RN  Outcome: Progressing  Goal: Absence of infection during hospitalization  1/20/2024 2332 by Triny Ramírez RN  Outcome: Progressing  1/20/2024 1813 by Lisbet Madsen RN  Outcome: Progressing  Goal: Absence of fever/infection during anticipated neutropenic period  1/20/2024 2332 by Triny Ramírez RN  Outcome: Progressing  1/20/2024 1813 by Lisbet Madsen RN  Outcome: Progressing     Problem: Safety - Adult  Goal: Free from fall injury  1/20/2024 2332 by Triny Ramírez RN  Outcome: Progressing  1/20/2024 1813 by Lisbet Madsen RN  Outcome: Progressing     Problem: Discharge Planning  Goal: Discharge to home or other facility with appropriate resources  1/20/2024 2332 by Triny Ramírez RN  Outcome: Progressing  1/20/2024 1813 by Lisbet Madsen RN  Outcome: Progressing     Problem: Chronic Conditions and Co-morbidities  Goal: Patient's chronic conditions and co-morbidity symptoms are monitored and maintained or improved  1/20/2024 2332 by Triny Ramírez RN  Outcome: Progressing  1/20/2024 1813 by Lisbet Madsen RN  Outcome: Progressing     
  Problem: Pain  Goal: Verbalizes/displays adequate comfort level or baseline comfort level  1/21/2024 2125 by Triny Ramírez RN  Outcome: Progressing  Flowsheets (Taken 1/21/2024 2047)  Verbalizes/displays adequate comfort level or baseline comfort level:   Encourage patient to monitor pain and request assistance   Assess pain using appropriate pain scale   Administer analgesics based on type and severity of pain and evaluate response   Implement non-pharmacological measures as appropriate and evaluate response   Consider cultural and social influences on pain and pain management   Notify Licensed Independent Practitioner if interventions unsuccessful or patient reports new pain  1/21/2024 1531 by Kirsten Chacon RN  Outcome: Progressing  Flowsheets (Taken 1/21/2024 0850)  Verbalizes/displays adequate comfort level or baseline comfort level:   Encourage patient to monitor pain and request assistance   Assess pain using appropriate pain scale   Administer analgesics based on type and severity of pain and evaluate response   Implement non-pharmacological measures as appropriate and evaluate response   Consider cultural and social influences on pain and pain management     Problem: Infection - Adult  Goal: Absence of infection at discharge  1/21/2024 2125 by Triny Ramírez RN  Outcome: Progressing  1/21/2024 1531 by Kirsten Chacon RN  Outcome: Progressing  Goal: Absence of infection during hospitalization  1/21/2024 2125 by Triny Ramírez RN  Outcome: Progressing  1/21/2024 1531 by Kirsten Chacon RN  Outcome: Progressing  Goal: Absence of fever/infection during anticipated neutropenic period  1/21/2024 2125 by Triny Ramírez RN  Outcome: Progressing  1/21/2024 1531 by Kirsten Chacon RN  Outcome: Progressing     Problem: Safety - Adult  Goal: Free from fall injury  1/21/2024 2125 by Triny Ramírez RN  Outcome: Progressing  Flowsheets (Taken 1/21/2024 2047)  Free From Fall Injury:   Instruct family/caregiver on 
  Problem: Pain  Goal: Verbalizes/displays adequate comfort level or baseline comfort level  Outcome: Progressing  Flowsheets (Taken 2024 1158)  Verbalizes/displays adequate comfort level or baseline comfort level:   Encourage patient to monitor pain and request assistance   Assess pain using appropriate pain scale   Administer analgesics based on type and severity of pain and evaluate response   Implement non-pharmacological measures as appropriate and evaluate response   Consider cultural and social influences on pain and pain management     Problem: Infection - Adult  Goal: Absence of infection at discharge  Outcome: Progressing  Goal: Absence of infection during hospitalization  Outcome: Progressing  Goal: Absence of fever/infection during anticipated neutropenic period  Outcome: Progressing     Problem: Safety - Adult  Goal: Free from fall injury  Outcome: Progressing     Problem: Discharge Planning  Goal: Discharge to home or other facility with appropriate resources  Outcome: Progressing     Problem: Chronic Conditions and Co-morbidities  Goal: Patient's chronic conditions and co-morbidity symptoms are monitored and maintained or improved  Outcome: Progressing     Problem: Postpartum  Goal: Experiences normal postpartum course  Description:  Postpartum OB-Pregnancy care plan goal which identifies if the mother is experiencing a normal postpartum course  Outcome: Progressing  Goal: Appropriate maternal -  bonding  Description:  Postpartum OB-Pregnancy care plan goal which identifies if the mother and  are bonding appropriately  Outcome: Progressing  Goal: Establishment of infant feeding pattern  Description:  Postpartum OB-Pregnancy care plan goal which identifies if the mother is establishing a feeding pattern with their   Outcome: Progressing  Goal: Incisions, wounds, or drain sites healing without S/S of infection  Outcome: Progressing     
  Problem: Vaginal Birth or  Section  Goal: Fetal and maternal status remain reassuring during the birth process  Description:  Birth OB-Pregnancy care plan goal which identifies if the fetal and maternal status remain reassuring during the birth process  2024 by Lisbet Madsen RN  Outcome: Completed  2024 by Lisbet Madsen RN  Outcome: Progressing  Flowsheets (Taken 2024 0710)  Fetal and Maternal Status Remain Reassuring During the Birth Process:   Monitor vital signs   Monitor fetal heart rate   Monitor uterine activity   Monitor labor progression (Vaginal delivery)  2024 by Pooja Tanner RN  Outcome: Progressing     Problem: Pain  Goal: Verbalizes/displays adequate comfort level or baseline comfort level  2024 by Lisbet Madsen RN  Outcome: Progressing  2024 by Lisbet Madsen RN  Outcome: Progressing  Flowsheets (Taken 2024 0710)  Verbalizes/displays adequate comfort level or baseline comfort level:   Encourage patient to monitor pain and request assistance   Assess pain using appropriate pain scale   Administer analgesics based on type and severity of pain and evaluate response   Implement non-pharmacological measures as appropriate and evaluate response   Consider cultural and social influences on pain and pain management  2024 by Pooja Tanner RN  Outcome: Progressing     Problem: Infection - Adult  Goal: Absence of infection at discharge  2024 by Lisbet Madsen RN  Outcome: Progressing  2024 by Lisbet Madsen RN  Outcome: Progressing  2024 by Pooja Tanner RN  Outcome: Progressing  Goal: Absence of infection during hospitalization  2024 by Lisbet Madsen RN  Outcome: Progressing  2024 by Lisbet Madsen RN  Outcome: Progressing  Flowsheets (Taken 2024 0710)  Absence of infection during hospitalization:   Assess and monitor for signs and symptoms of 
  Problem: Vaginal Birth or  Section  Goal: Fetal and maternal status remain reassuring during the birth process  Description:  Birth OB-Pregnancy care plan goal which identifies if the fetal and maternal status remain reassuring during the birth process  2024 by Lisbet Madsen RN  Outcome: Progressing  Flowsheets (Taken 2024)  Fetal and Maternal Status Remain Reassuring During the Birth Process:   Monitor vital signs   Monitor fetal heart rate   Monitor uterine activity   Monitor labor progression (Vaginal delivery)  2024 by Pooja Tanner RN  Outcome: Progressing     Problem: Pain  Goal: Verbalizes/displays adequate comfort level or baseline comfort level  2024 by Lisbet Madsen RN  Outcome: Progressing  Flowsheets (Taken 2024)  Verbalizes/displays adequate comfort level or baseline comfort level:   Encourage patient to monitor pain and request assistance   Assess pain using appropriate pain scale   Administer analgesics based on type and severity of pain and evaluate response   Implement non-pharmacological measures as appropriate and evaluate response   Consider cultural and social influences on pain and pain management  2024 by Pooja Tanner RN  Outcome: Progressing     Problem: Infection - Adult  Goal: Absence of infection at discharge  2024 by Lisbet Madsen RN  Outcome: Progressing  2024 by Pooja Tanner RN  Outcome: Progressing  Goal: Absence of infection during hospitalization  2024 by Lisbet Madsen RN  Outcome: Progressing  Flowsheets (Taken 2024)  Absence of infection during hospitalization:   Assess and monitor for signs and symptoms of infection   Monitor lab/diagnostic results   Instruct and encourage patient and family to use good hand hygiene technique  2024 by Pooja Tanner RN  Outcome: Progressing  Goal: Absence of fever/infection during 
  Problem: Vaginal Birth or  Section  Goal: Fetal and maternal status remain reassuring during the birth process  Description:  Birth OB-Pregnancy care plan goal which identifies if the fetal and maternal status remain reassuring during the birth process  Outcome: Progressing     Problem: Pain  Goal: Verbalizes/displays adequate comfort level or baseline comfort level  Outcome: Progressing     Problem: Infection - Adult  Goal: Absence of infection at discharge  Outcome: Progressing  Goal: Absence of infection during hospitalization  Outcome: Progressing  Goal: Absence of fever/infection during anticipated neutropenic period  Outcome: Progressing     Problem: Safety - Adult  Goal: Free from fall injury  Outcome: Progressing     Problem: Discharge Planning  Goal: Discharge to home or other facility with appropriate resources  Outcome: Progressing     Problem: Chronic Conditions and Co-morbidities  Goal: Patient's chronic conditions and co-morbidity symptoms are monitored and maintained or improved  Outcome: Progressing     
(Taken 1/21/2024 2047)  Incisions, Wounds, or Drain Sites Healing Without Sign and Symptoms of Infection:   ADMISSION and DAILY: Assess and document risk factors for pressure ulcer development   TWICE DAILY: Assess and document skin integrity   TWICE DAILY: Assess and document dressing/incision, wound bed, drain sites and surrounding tissue

## 2024-01-22 NOTE — DISCHARGE SUMMARY
Obstetrical Discharge Form    Gestational Age:  40w6d    Antepartum complications: no prenatal care    Date of Delivery:   2024      Type of Delivery:   vaginal, spontaneous    Delivered By:   Dr. Santos             Baby:       Information for the patient's :  Enedina Tierney [[id number]]      Anesthesia:    Epidural    Intrapartum complications: None    Feeding method:   breast    Postpartum complications: none    Discharge Date:   2024    Condition of discharge:  good    Plan:   Follow up    in 6 week(s)

## 2024-01-24 NOTE — PROGRESS NOTES
01/22/24 0752   Encounter Summary   Encounter Overview/Reason  Volunteer Encounter   Service Provided For: Patient   Referral/Consult From: Volunteer   Support System Significant other   Last Encounter  01/21/24  (Volunteer visit by Maribell Narayanan, [facility] ; documented by . Deysi Viera, , [place].)   Complexity of Encounter Low   Begin Time 0940   End Time  0950   Total Time Calculated 10 min   Spiritual/Emotional needs   Type Spiritual Support   Rituals, Rites and Sacraments   Type Alevism Communion  (Given rosary and pamphelt.)   Assessment/Intervention/Outcome   Assessment Calm   Intervention Active listening;Sustaining Presence/Ministry of presence   Outcome Comfort   Plan and Referrals   Plan/Referrals Continue to visit, (comment)  (Rosary and pamphlet given)       
   01/24/24 0728   Encounter Summary   Encounter Overview/Reason  Volunteer Encounter   Service Provided For: Patient   Referral/Consult From: Volunteer   Support System Significant other   Last Encounter  01/22/24  (Volunteer visit by Norm Elkins, [facility] ; documented by DANITZA Asif.)   Complexity of Encounter Low   Begin Time 1000   End Time  1010   Total Time Calculated 10 min   Spiritual/Emotional needs   Type Spiritual Support   Rituals, Rites and Sacraments   Type Yarsanism Communion   Assessment/Intervention/Outcome   Assessment Calm   Intervention Active listening   Outcome Coping;Comfort   Plan and Referrals   Plan/Referrals Continue to visit, (comment)       
Department of Obstetrics and Gynecology  Labor and Delivery   Midwifery Progress Note      SUBJECTIVE:  Lorrie has some upper abdominal pain; improved with pepcid.     OBJECTIVE:      Fetal heart rate:       Baseline Heart Rate:  130        Accelerations:  present       Variability:  moderate       Decelerations:  absent         Contraction frequency: 2-3 minutes    Membranes:  Ruptured clear fluid    Cervix:         Dilation:  6 cm         Effacement:  90         Station:  -1         Position:  anterior             ASSESSMENT     27 yo  at 40.6 weeks   Limited prenatal care  Vaginal bleeding; likely bloody show  Cat I FHR tracing  GBS neg     PLAN:    Moderate amount of bleeding noted with exam; likely d/t rapid cervical change. Will keep a close eye on amount.     Anticipate labor progress and .     I have collaborated and updated Dr Santos and the MD agrees with the current POC.    
Department of Obstetrics and Gynecology  Labor and Delivery   Midwifery Progress Note      SUBJECTIVE: Lorrie Graham is resting comfortably after epidural placement.        OBJECTIVE:      Fetal heart rate:       Baseline Heart Rate:  125        Accelerations:  present       Variability:  moderate       Decelerations:  absent         Contraction frequency: 2-4 minutes    Membranes:  Ruptured clear fluid    Cervix:         Dilation:  3 cm         Effacement:  80%         Station:  -2         Position:  mid             ASSESSMENT   27 yo  at 40.6 wks  No PNC  Category I FHR tracing   AROM for clear fluid   GBS neg     PLAN:  AROM for small amount of clear fluid.     Pitocin titrated per protocol. Anticipated normal labor progress and .     I have collaborated and updated Dr Santos and the MD agrees with the current POC.    
Department of Obstetrics and Gynecology  Labor and Delivery   Post Partum Progress Note      SUBJECTIVE:  Doing well with no complaints. Reports bleeding is decreasing and pain is well controlled with medication. Has voided without difficulty. Has not had BM, but + flatus. Eating and drinking well. Denies HA/visual changes/epigastric pain. Breastfeeding is going well. Reports good social support. Denies emotional concerns.     OBJECTIVE:      Vitals:  /66   Pulse 51   Temp 97.9 °F (36.6 °C) (Oral)   Resp 17   SpO2 99%   Breastfeeding Unknown   Lab Results   Component Value Date    WBC 5.6 2024    HGB 11.3 (L) 2024    HCT 35.2 (L) 2024    MCV 84.6 2024     2024       ABDOMEN:  Soft, non-tender. Fundus firm at u-1.   LOCHIA: Normal per pt  LUNGS: Normal effort on room air  HEART: normal rate per VS  EXTREMITIES: No calf tenderness or erythema per pt. Appropriate swelling bilaterally       ASSESSMENT:      PPD # 1  S/p   Breastfeeding well      PLAN:     Continue routine PP orders.  Will continue to monitor.      CONCHA Morin CNM  
Department of Obstetrics and Gynecology  Labor and Delivery   Post Partum Progress Note      SUBJECTIVE:  Doing well with no complaints. Reports bleeding is decreasing and pain is well controlled with medication. Has voided without difficulty. Has not had BM, but + flatus. Eating and drinking well. Denies HA/visual changes/epigastric pain. Breastfeeding is going well. Reports good social support. Denies emotional concerns.     OBJECTIVE:      Vitals:  /69   Pulse 55   Temp 97.8 °F (36.6 °C) (Oral)   Resp 18   SpO2 100%   Breastfeeding Unknown   Lab Results   Component Value Date    WBC 5.6 2024    HGB 11.3 (L) 2024    HCT 35.2 (L) 2024    MCV 84.6 2024     2024       ABDOMEN:  Soft, non-tender. Fundus firm at u-1.    LOCHIA: Normal per pt  LUNGS: Normal effort on room air  HEART: Normal rate per VS  EXTREMITIES: No calf tenderness or erythema per pt. Appropriate swelling bilaterally       ASSESSMENT:      PPD # 2  S/p   Breastfeeding well      PLAN:     Will plan for discharge today.  Discharge teaching completed including counseling on warning signs (heavy vaginal bleeding, s/s of preeclampsia, fever >100.4, ACHES, s/s of PPD).  Rx for colace and ibuprofen.  Pt to schedule f/u pp visit at 6 weeks in the office.       CONCHA Morin CNM   
Outpatient Pharmacy Progress Note for Meds-to-Beds    Total number of Prescriptions Filled: 2  The following medications were dispensed to the patient during the discharge process:  docusate  ibuprofen    Additional Documentation:  Medications given to nurse   to provide to patient      Thank you for letting us serve your patients.  [place] [id number]    Phone: [phone]    Fax: [phone]        
Pt ambulatory arrival via WC to unit for c/o abdominal pain. Pt shown to TR-02 oriented to room, instructed to change into gown and obtain CC urine sample. Pt reports positive FM, denies bleeding or leaking of fluid, POC discussed, call light within reach          
TR to Dr. Santos  ,updated on pt arrival,SVE and limited prenatal care ,new orders to admit to labor pathway at this time.     Pt taken to LD-03 per self, all belongings and visitor remain at bedside  
2024 11.3 (L)  12.5 - 16.0 GM/DL Final    Hematocrit 2024 35.2 (L)  37 - 47 % Final    MCV 2024 84.6  78 - 100 FL Final    MCH 2024 27.2  27 - 31 PG Final    MCHC 2024 32.1  32.0 - 36.0 % Final    RDW 2024 15.6 (H)  11.7 - 14.9 % Final    Platelets 2024 198  140 - 440 K/CU MM Final    MPV 2024 12.4 (H)  7.5 - 11.1 FL Final    Differential Type 2024 AUTOMATED DIFFERENTIAL   Final    Segs Relative 2024 48.1  36 - 66 % Final    Lymphocytes % 2024 41.6  24 - 44 % Final    Monocytes % 2024 7.3 (H)  0 - 4 % Final    Eosinophils % 2024 2.1  0 - 3 % Final    Basophils % 2024 0.5  0 - 1 % Final    Segs Absolute 2024 2.7  K/CU MM Final    Lymphocytes Absolute 2024 2.3  K/CU MM Final    Monocytes Absolute 2024 0.4  K/CU MM Final    Eosinophils Absolute 2024 0.1  K/CU MM Final    Basophils Absolute 2024 0.0  K/CU MM Final    Nucleated RBC % 2024 0.0  % Final    Total Nucleated RBC 2024 0.0  K/CU MM Final    Total Immature Neutrophil 2024 0.02  K/CU MM Final    Immature Neutrophil % 2024 0.4  0 - 0.43 % Final    POC Glucose 2024 82  70 - 99 MG/DL Final    POC Glucose 2024 94  70 - 99 MG/DL Final        There is no immunization history for the selected administration types on file for this patient.    Patient Active Problem List    Diagnosis Date Noted    Labor and delivery indication for care or intervention 2023       Assessment:  Term AGA infant female doing well.    Maternal labs have been reviewed and are luciano    Blood sugar monitoring is normal, BF well    Plan: Continue routine  care.    Plan dicussed with mom through audio interpretor    Electronically signed on 2024 at 9:26 AM by Kayden Espino MD, MD

## 2025-03-31 NOTE — FLOWSHEET NOTE
MOM AMBULATES FROM UNIT ESCORTED BY NURSING STAFF CARRYING INFANT CAR SEAT, TO HOSPITAL'S FRONT DISCHARGE EXIT. MOM SECURES INFANT INTO CAR AND M/B DYAD ARE DISCHARGED TO HOME VIA PRIVATE AUTO.   4 = No assist / stand by assistance

## 2025-05-20 ENCOUNTER — LAB (OUTPATIENT)
Dept: LAB | Age: 30
End: 2025-05-20

## 2025-05-25 LAB
C TRACH RRNA SPEC QL NAA+PROBE: NEGATIVE
N GONORRHOEA RRNA SPEC QL NAA+PROBE: NEGATIVE
SPEC CONTAINER SPEC: NORMAL
SPECIMEN SOURCE: NORMAL
SPECIMEN SOURCE: NORMAL
T VAGINALIS RRNA SPEC QL NAA+PROBE: NEGATIVE